# Patient Record
Sex: MALE | Race: BLACK OR AFRICAN AMERICAN | NOT HISPANIC OR LATINO | URBAN - METROPOLITAN AREA
[De-identification: names, ages, dates, MRNs, and addresses within clinical notes are randomized per-mention and may not be internally consistent; named-entity substitution may affect disease eponyms.]

---

## 2019-07-19 ENCOUNTER — EMERGENCY (EMERGENCY)
Facility: HOSPITAL | Age: 34
LOS: 1 days | Discharge: HOME | End: 2019-07-19
Attending: EMERGENCY MEDICINE | Admitting: EMERGENCY MEDICINE
Payer: MEDICAID

## 2019-07-19 VITALS
SYSTOLIC BLOOD PRESSURE: 173 MMHG | DIASTOLIC BLOOD PRESSURE: 118 MMHG | RESPIRATION RATE: 18 BRPM | HEART RATE: 116 BPM | OXYGEN SATURATION: 100 % | HEIGHT: 73 IN | WEIGHT: 250 LBS | TEMPERATURE: 98 F

## 2019-07-19 VITALS
SYSTOLIC BLOOD PRESSURE: 160 MMHG | RESPIRATION RATE: 18 BRPM | DIASTOLIC BLOOD PRESSURE: 117 MMHG | OXYGEN SATURATION: 96 % | HEART RATE: 85 BPM

## 2019-07-19 DIAGNOSIS — R53.1 WEAKNESS: ICD-10-CM

## 2019-07-19 DIAGNOSIS — E86.0 DEHYDRATION: ICD-10-CM

## 2019-07-19 DIAGNOSIS — R53.83 OTHER FATIGUE: ICD-10-CM

## 2019-07-19 LAB
APPEARANCE UR: CLEAR — SIGNIFICANT CHANGE UP
BILIRUB UR-MCNC: NEGATIVE — SIGNIFICANT CHANGE UP
COLOR SPEC: YELLOW — SIGNIFICANT CHANGE UP
DIFF PNL FLD: NEGATIVE — SIGNIFICANT CHANGE UP
GLUCOSE UR QL: NEGATIVE — SIGNIFICANT CHANGE UP
KETONES UR-MCNC: NEGATIVE — SIGNIFICANT CHANGE UP
LEUKOCYTE ESTERASE UR-ACNC: NEGATIVE — SIGNIFICANT CHANGE UP
NITRITE UR-MCNC: NEGATIVE — SIGNIFICANT CHANGE UP
PCP SPEC-MCNC: SIGNIFICANT CHANGE UP
PH UR: 7 — SIGNIFICANT CHANGE UP (ref 5–8)
PROT UR-MCNC: 100 MG/DL
SP GR SPEC: 1.01 — SIGNIFICANT CHANGE UP (ref 1–1.03)
UROBILINOGEN FLD QL: 1 E.U./DL — SIGNIFICANT CHANGE UP

## 2019-07-19 PROCEDURE — 81001 URINALYSIS AUTO W/SCOPE: CPT

## 2019-07-19 PROCEDURE — 84484 ASSAY OF TROPONIN QUANT: CPT

## 2019-07-19 PROCEDURE — 85730 THROMBOPLASTIN TIME PARTIAL: CPT

## 2019-07-19 PROCEDURE — 85025 COMPLETE CBC W/AUTO DIFF WBC: CPT

## 2019-07-19 PROCEDURE — 99284 EMERGENCY DEPT VISIT MOD MDM: CPT | Mod: 25

## 2019-07-19 PROCEDURE — 85610 PROTHROMBIN TIME: CPT

## 2019-07-19 PROCEDURE — 80307 DRUG TEST PRSMV CHEM ANLYZR: CPT

## 2019-07-19 PROCEDURE — 93010 ELECTROCARDIOGRAM REPORT: CPT

## 2019-07-19 PROCEDURE — 80053 COMPREHEN METABOLIC PANEL: CPT

## 2019-07-19 PROCEDURE — 36415 COLL VENOUS BLD VENIPUNCTURE: CPT

## 2019-07-19 PROCEDURE — 83735 ASSAY OF MAGNESIUM: CPT

## 2019-07-19 PROCEDURE — 93005 ELECTROCARDIOGRAM TRACING: CPT

## 2019-07-19 PROCEDURE — 99283 EMERGENCY DEPT VISIT LOW MDM: CPT | Mod: 25

## 2019-07-19 RX ORDER — SODIUM CHLORIDE 9 MG/ML
1000 INJECTION INTRAMUSCULAR; INTRAVENOUS; SUBCUTANEOUS ONCE
Refills: 0 | Status: COMPLETED | OUTPATIENT
Start: 2019-07-19 | End: 2019-07-19

## 2019-07-19 RX ADMIN — SODIUM CHLORIDE 1000 MILLILITER(S): 9 INJECTION INTRAMUSCULAR; INTRAVENOUS; SUBCUTANEOUS at 12:40

## 2019-07-19 NOTE — ED PROVIDER NOTE - NSFOLLOWUPINSTRUCTIONS_ED_ALL_ED_FT
Please follow up with your primary physician in 1-2 days for re evaluation.  Please return to ER immediately should your symptoms worsen or if you have any concern prior to this recommended follow up.      DEHYDRATION - AfterCare(R) Instructions(ER/ED)     Dehydration    WHAT YOU NEED TO KNOW:    Dehydration is a condition that develops when your body does not have enough fluid. You may become dehydrated if you do not drink enough water or lose too much fluid. Fluid loss may also cause loss of electrolytes (minerals), such as sodium.    DISCHARGE INSTRUCTIONS:    Return to the emergency department if:     You have a seizure.      You are confused or cannot think clearly.      You are extremely sleepy, or another person cannot wake you.       You become dizzy or faint when you stand.      You are not able to urinate.      You have trouble breathing.      You have a fast or irregular heartbeat.      Your hands or feet are cold, or your face is pale.     Contact your healthcare provider if:     You have trouble drinking liquids because you are vomiting.      Your symptoms get worse.      You have a fever.       You feel very weak or tired.      You have questions or concerns about your condition or care.    Follow up with your healthcare provider as directed: Write down your questions so you remember to ask them during your visits.     Prevent or manage dehydration:     Drink liquids as directed. Liquids that contain water, sugar, and minerals can help your body hold in fluid and help prevent dehydration. Drink liquids throughout the day, not just when you feel thirsty. Men should drink about 3 liters (13 eight-ounce cups) of liquid each day. Women should drink about 2 liters (9 eight-ounce cups) of liquid each day. Drink even more liquid if you will be outdoors, in the sun for a long time, or exercising.       Stay cool. Limit the time you spend outdoors during the hottest part of the day. Dress in lightweight clothes.       Keep track of how often you urinate. If you urinate less than usual or your urine is darker, drink more liquids.         © Copyright [employer] 2019 All illustrations and images included in CareNotes are the copyrighted property of [employer] or [employer].      back to top                      © Copyright [employer] 2019

## 2019-07-19 NOTE — ED PROVIDER NOTE - ST/T WAVE
T wave inversions to inferior and lateral leads, NSST changes Previously noted T wave inversions are no longer present.  NSST changes.

## 2019-07-19 NOTE — ED PROVIDER NOTE - CLINICAL SUMMARY MEDICAL DECISION MAKING FREE TEXT BOX
Patient in ED w concern for generalized fatigue over the past several days - no additional specific complaints are noted.  Patient noted to have abnormal EKG with T wave inversions to inferior and lateral leads.  He denies CP or SOB.  EKG is repeated and previously inverted T waves are now upright.  Suspect lead placement as cause of abnormal EKG.  Trop added and negative.  Patient feeling improved in ED with IVF - labs showing slightly elevated Cr, suspect 2/2 dehydration.  Patient is requesting discharge and notes he has a PCP who he can follow up with.  Patient is instructed to follow up with his PCP in 1-2 days for re evaluation and instructed to return to ED immediately should his symptoms worsen or if he has any concern prior to this recommended follow up.  Patient is aware of plan and verbalizes his understanding.  Will discharge home at this time.

## 2019-07-19 NOTE — ED ADULT NURSE NOTE - NSIMPLEMENTINTERV_GEN_ALL_ED
Implemented All Fall Risk Interventions:  [place] to call system. Call bell, personal items and telephone within reach. Instruct patient to call for assistance. Room bathroom lighting operational. Non-slip footwear when patient is off stretcher. Physically safe environment: no spills, clutter or unnecessary equipment. Stretcher in lowest position, wheels locked, appropriate side rails in place. Provide visual cue, wrist band, yellow gown, etc. Monitor gait and stability. Monitor for mental status changes and reorient to person, place, and time. Review medications for side effects contributing to fall risk. Reinforce activity limits and safety measures with patient and family.

## 2019-07-19 NOTE — ED ADULT TRIAGE NOTE - CHIEF COMPLAINT QUOTE
Pt BIBA CO worsening weakness and numbness to BIlat UEs x3 days.  FSBG in field: 172.  EKG in progress.  PT denies N/V/D, SOB, Fevers, CP and Dizziness.

## 2019-07-19 NOTE — ED ADULT NURSE NOTE - OBJECTIVE STATEMENT
Patient diane from doctors office. Per patient "I was feeling really bad so I called my doctor and told them it was an emergency." Patient states that during the doctors visit "I fell to the ground and my hands were numb and I had acid reflux" (Patient is pointing to epigastric region while talking.) He denies LOC. He states "I remember it all." Symptoms have since resolved and patient states "I feel a lot better now." Patient states his only PMH was "when I was 14 they took a biopsy of my kidney and told me I have kidney disease." Patient currently denies taking medication. He denies cigarette use or use of drug/alcohol.

## 2019-07-19 NOTE — ED PROVIDER NOTE - OBJECTIVE STATEMENT
34 year old male with history of kidney disease, on no medications presents to ED with concern for generalized weakness over the past few weeks.  Patient feels he may be dehydrated and is requesting "blood be checked."  He denies any specific complaints otherwise, and when questioned - denies fever, chills, headache, changes to vision, chest pain, shortness of breath, abdominal pain, nausea, emesis, changes to bowel movements, rashes, sick contacts or any additional acute complaints or concerns at this time.

## 2019-07-19 NOTE — ED ADULT NURSE NOTE - NS ED NURSE ELOPE COMMENTS
Patient due to be discharge but unable to be found. Pt was called at [phone] with no answer. IV not removed before elopement.

## 2019-07-19 NOTE — ED ADULT NURSE NOTE - CHPI ED NUR SYMPTOMS NEG
no nausea/no congestion/no diaphoresis/no dizziness/no shortness of breath/no syncope/no chills/no chest pain/no vomiting/no fever/no back pain

## 2020-01-26 NOTE — ED PROVIDER NOTE - NS ED NOTE AC HIGH RISK COUNTRIES
Rosemarie is a 60 year old female, complaining of more frequent tension headaches, migraines that is a chronic issue. She needs refill for Fioricet as she has not been able to get into PCP or hear response from nurses from PCP's office. She took her last dose yesterday.       No

## 2021-09-27 NOTE — ED ADULT NURSE NOTE - CAS TRG GENERAL NORM CIRC DET
Encounter Diagnosis   Name Primary?     Acute nasopharyngitis Yes      Orders Placed This Encounter     Symptomatic COVID-19 Virus (Coronavirus) by PCR Nasopharyngeal     Standing Status:   Future     Standing Expiration Date:   10/27/2021     Order Specific Question:   Reason?     Answer:   No Procedure     Order Specific Question:   Asymptomatic or Symptomatic:     Answer:   Symptomatic     Order Specific Question:   Symptomatic for COVID-19 as defined by CDC?     Answer:   Yes     Order Specific Question:   Date of Symptom Onset     Answer:   9/23/2021     Order Specific Question:   Employed in healthcare setting?     Answer:   No     Order Specific Question:   Close contact of Employee or Provider/Resident/Faculty at [facility]?     Answer:   No     Order Specific Question:   Resident lives or works in a congregate care/living setting?     Answer:   No     Order Specific Question:   First COVID-19 test?     Answer:   No     Provider E-Visit time total (minutes): 12  
Strong peripheral pulses